# Patient Record
Sex: MALE | Race: WHITE | Employment: OTHER | URBAN - METROPOLITAN AREA
[De-identification: names, ages, dates, MRNs, and addresses within clinical notes are randomized per-mention and may not be internally consistent; named-entity substitution may affect disease eponyms.]

---

## 2018-04-30 PROBLEM — E55.9 VITAMIN D DEFICIENCY: Status: ACTIVE | Noted: 2018-04-30

## 2018-12-12 PROBLEM — R97.20 ELEVATED PSA: Status: ACTIVE | Noted: 2018-12-12

## 2019-12-16 PROBLEM — C61 PROSTATE CANCER (HCC): Status: ACTIVE | Noted: 2019-12-16

## 2020-04-14 PROBLEM — R23.2 HOT FLASH IN MALE: Status: ACTIVE | Noted: 2020-04-14

## 2020-09-10 PROBLEM — Z92.3 HISTORY OF RADIATION THERAPY: Status: ACTIVE | Noted: 2020-09-10

## 2021-03-22 ENCOUNTER — OFFICE VISIT (OUTPATIENT)
Dept: ORTHOPEDIC SURGERY | Age: 76
End: 2021-03-22
Payer: MEDICARE

## 2021-03-22 VITALS — WEIGHT: 190 LBS | BODY MASS INDEX: 27.2 KG/M2 | HEIGHT: 70 IN

## 2021-03-22 DIAGNOSIS — M17.11 PRIMARY OSTEOARTHRITIS OF RIGHT KNEE: Primary | ICD-10-CM

## 2021-03-22 DIAGNOSIS — M25.561 CHRONIC PAIN OF RIGHT KNEE: ICD-10-CM

## 2021-03-22 DIAGNOSIS — G89.29 CHRONIC PAIN OF RIGHT KNEE: ICD-10-CM

## 2021-03-22 PROCEDURE — 99203 OFFICE O/P NEW LOW 30 MIN: CPT | Performed by: ORTHOPAEDIC SURGERY

## 2021-03-22 PROCEDURE — 1036F TOBACCO NON-USER: CPT | Performed by: ORTHOPAEDIC SURGERY

## 2021-03-22 PROCEDURE — 4040F PNEUMOC VAC/ADMIN/RCVD: CPT | Performed by: ORTHOPAEDIC SURGERY

## 2021-03-22 PROCEDURE — G8427 DOCREV CUR MEDS BY ELIG CLIN: HCPCS | Performed by: ORTHOPAEDIC SURGERY

## 2021-03-22 PROCEDURE — 1123F ACP DISCUSS/DSCN MKR DOCD: CPT | Performed by: ORTHOPAEDIC SURGERY

## 2021-03-22 PROCEDURE — 20610 DRAIN/INJ JOINT/BURSA W/O US: CPT | Performed by: ORTHOPAEDIC SURGERY

## 2021-03-22 PROCEDURE — 3017F COLORECTAL CA SCREEN DOC REV: CPT | Performed by: ORTHOPAEDIC SURGERY

## 2021-03-22 PROCEDURE — G8417 CALC BMI ABV UP PARAM F/U: HCPCS | Performed by: ORTHOPAEDIC SURGERY

## 2021-03-22 PROCEDURE — G8484 FLU IMMUNIZE NO ADMIN: HCPCS | Performed by: ORTHOPAEDIC SURGERY

## 2021-03-22 RX ORDER — BUPIVACAINE HYDROCHLORIDE 2.5 MG/ML
3 INJECTION, SOLUTION INFILTRATION; PERINEURAL ONCE
Status: COMPLETED | OUTPATIENT
Start: 2021-03-22 | End: 2021-03-22

## 2021-03-22 RX ORDER — TRIAMCINOLONE ACETONIDE 40 MG/ML
80 INJECTION, SUSPENSION INTRA-ARTICULAR; INTRAMUSCULAR ONCE
Status: COMPLETED | OUTPATIENT
Start: 2021-03-22 | End: 2021-03-22

## 2021-03-22 RX ADMIN — BUPIVACAINE HYDROCHLORIDE 7.5 MG: 2.5 INJECTION, SOLUTION INFILTRATION; PERINEURAL at 12:20

## 2021-03-22 RX ADMIN — TRIAMCINOLONE ACETONIDE 80 MG: 40 INJECTION, SUSPENSION INTRA-ARTICULAR; INTRAMUSCULAR at 12:28

## 2021-03-22 NOTE — PROGRESS NOTES
Chief Complaint:   Chief Complaint   Patient presents with    Knee Injury     Right knee injury end of February. No X-rays. Was squatting, when he tried to get back up, Rt knee locked and he felt severe pain. Knee has been tender since. Has difficulty getting in and out of car. Saw [facility] 2015 for same knee, \"thought it might be issue with meniscus\"  Usng ice and wearing brace. HPI 42-year-old man here for evaluation of right knee pain. Onset about 1 month ago. Felt pain primarily medial aspect right knee after squatting. Denies significant knee pain prior. History of prostate cancer but currently states well controlled. Patient Active Problem List   Diagnosis    Gastroesophageal reflux disease without esophagitis    Vitamin D deficiency    Elevated PSA    Prostate cancer ([facility] [address]    Hot flash in male    History of radiation therapy       Past Medical History:   Diagnosis Date    Elevated PSA 12/12/2018    GERD (gastroesophageal reflux disease)        Past Surgical History:   Procedure Laterality Date    COLONOSCOPY  08/29/2019    FINGER SURGERY      Cyst removal    FOOT SURGERY      TONSILLECTOMY         Current Outpatient Medications   Medication Sig Dispense Refill    calcium carbonate (OSCAL) 500 MG TABS tablet Take 500 mg by mouth daily      esomeprazole (NEXIUM) 20 MG capsule Take 40 mg by mouth every morning (before breakfast)       Cholecalciferol (VITAMIN D3) 5000 UNITS TABS Take by mouth daily       No current facility-administered medications for this visit.         No Known Allergies    Social History     Socioeconomic History    Marital status:      Spouse name: None    Number of children: None    Years of education: None    Highest education level: High school graduate   Occupational History    None   Social Needs    Financial resource strain: Not hard at all   [place] insecurity     Worry: Never true     Inability: None    Transportation needs     Medical: No Non-medical: None   Tobacco Use    Smoking status: Former Smoker     Packs/day: 0.50     Years: 2.00     Pack years: 1.00     Types: Cigarettes     Quit date: 1964     Years since quittin.6    Smokeless tobacco: Never Used   Substance and Sexual Activity    Alcohol use: Yes     Alcohol/week: 2.0 standard drinks     Types: 2 Glasses of wine per week     Comment: daily     Drug use: No    Sexual activity: None   Lifestyle    Physical activity     Days per week: 6 days     Minutes per session: None    Stress: None   Relationships    Social connections     Talks on phone: None     Gets together: None     Attends Mu-ism service: None     Active member of club or organization: None     Attends meetings of clubs or organizations: None     Relationship status: None    Intimate partner violence     Fear of current or ex partner: None     Emotionally abused: None     Physically abused: None     Forced sexual activity: None   Other Topics Concern    None   Social History Narrative    None       Family History   Problem Relation Age of Onset    Cancer Mother     Kidney Disease Father     Heart Disease Father     Cancer Brother         prostate         Review of Systems   No fever, chills, or other constitutionalsymptoms. No numbness or other neuro symptoms. No chest pain. No dyspnea. [unfilled]   No acute distress. He is thin and muscular. Right knee demonstrates no effusion. Some tenderness along the medial joint line. Full range of motion right knee without laxity on varus valgus or Lachman testing. No pain elicited on right hip rotation. Physical Exam    Patient is alert and oriented. Well-developed well-nourished. BMI 27. Pupils equal and reactive. Scleraeanicteric. Neck supple  Lungs clear. Cardiac rate and rhythm regular. Abdomen soft and nontender. Skin warm and dry.        XRAY: Knee x-rays today including bilateral standing AP, flexion weightbearing and lateral view

## 2022-01-03 ENCOUNTER — HOSPITAL ENCOUNTER (EMERGENCY)
Age: 77
Discharge: HOME | End: 2022-01-03
Payer: MEDICARE

## 2022-01-03 VITALS
DIASTOLIC BLOOD PRESSURE: 80 MMHG | WEIGHT: 195 LBS | TEMPERATURE: 97.7 F | BODY MASS INDEX: 27.98 KG/M2 | OXYGEN SATURATION: 97 % | RESPIRATION RATE: 16 BRPM | SYSTOLIC BLOOD PRESSURE: 140 MMHG | HEART RATE: 78 BPM

## 2022-01-03 DIAGNOSIS — S61.210A LACERATION OF RIGHT INDEX FINGER WITHOUT FOREIGN BODY WITHOUT DAMAGE TO NAIL, INITIAL ENCOUNTER: Primary | ICD-10-CM

## 2022-01-03 PROCEDURE — 6370000000 HC RX 637 (ALT 250 FOR IP): Performed by: NURSE PRACTITIONER

## 2022-01-03 PROCEDURE — 12002 RPR S/N/AX/GEN/TRNK2.6-7.5CM: CPT

## 2022-01-03 PROCEDURE — 12001 RPR S/N/AX/GEN/TRNK 2.5CM/<: CPT

## 2022-01-03 PROCEDURE — 99283 EMERGENCY DEPT VISIT LOW MDM: CPT

## 2022-01-03 RX ORDER — CEFUROXIME AXETIL 500 MG/1
500 TABLET ORAL 2 TIMES DAILY
Qty: 14 TABLET | Refills: 0 | Status: SHIPPED | OUTPATIENT
Start: 2022-01-03 | End: 2022-01-10

## 2022-01-03 RX ADMIN — Medication: at 16:50

## 2022-01-03 ASSESSMENT — PAIN DESCRIPTION - LOCATION: LOCATION: FINGER (COMMENT WHICH ONE)

## 2022-01-03 ASSESSMENT — PAIN DESCRIPTION - ORIENTATION: ORIENTATION: RIGHT

## 2022-01-03 ASSESSMENT — PAIN DESCRIPTION - PAIN TYPE: TYPE: ACUTE PAIN

## 2022-01-03 ASSESSMENT — PAIN SCALES - GENERAL: PAINLEVEL_OUTOF10: 4

## 2022-01-03 NOTE — ED PROVIDER NOTES
[address]  Department of Emergency Medicine   ED  Encounter Note  Admit Date/RoomTime: 1/3/2022  3:42 PM  ED Room: [facility]/    NAME: Mohan Tidwell  : 1945  MRN: [id number]     Chief Complaint:  Laceration (right index finger lac, bleeding controlled, UTD on TDAP)    History of Present Illness       Mohan Tidwell is a 68 y.o. old male who presents to the emergency department for evaluation of right index finger laceration that occurred just prior to arrival.  Patient states a plastic fan blade accidentally lacerated his finger. He denies any uncontrolled bleeding or anticoagulant use. He has no numbness, weakness or limited range of motion of his fingers secondary to his injury. He is left-hand dominant and his tetanus vaccination is up-to-date within the last 5 years. His symptoms are aggravated by palpation and use and alleviated with rest.  His symptoms are mild in severity and persistent nature. ROS   Pertinent positives and negatives are stated within HPI, all other systems reviewed and are negative. Past Medical History:  has a past medical history of Elevated PSA and GERD (gastroesophageal reflux disease). Surgical History:  has a past surgical history that includes Tonsillectomy; Foot surgery; Finger surgery; and Colonoscopy (2019). Social History:  reports that he quit smoking about 57 years ago. His smoking use included cigarettes. He has a 1.00 pack-year smoking history. He has never used smokeless tobacco. He reports current alcohol use of about 2.0 standard drinks of alcohol per week. He reports that he does not use drugs. Family History: family history includes Cancer in his brother and mother; Heart Disease in his father; Kidney Disease in his father. Allergies: Patient has no known allergies.     Physical Exam   Oxygen Saturation Interpretation: Normal.        ED Triage Vitals   BP Temp Temp Source Pulse Resp SpO2 Height Weight   01/03/22 1231 01/03/22 1231 01/03/22 1231 01/03/22 1231 01/03/22 1231 01/03/22 1231 -- 01/03/22 1342   (!) 140/80 97.7 °F (36.5 °C) Infrared 78 16 97 %  195 lb (88.5 kg)         · Constitutional:  Alert, development consistent with age. · HEENT:  NC/NT. No outward sign of trauma. Airway patent. · Neck:  Normal ROM. Supple. · Respiratory: No respiratory distress or increased work of breathing. · Cardiovascular: Regular rate and rhythm. Strong distal pulses. · Right Upper Extremity(s): Index finger middle phalanx                 Tenderness: Mild localized at the laceration however there is no bony tenderness through the finger, hand or wrist.                Swelling: None. Deformity: no deformity observed/palpated. ROM: full range of motion. Skin: There is a 1 cm partial-thickness laceration on the dorsal aspect with a 1.6 cm partial-thickness laceration on the volar aspect, bleeding is controlled, no foreign body or tendon injury. Neurovascular: Motor deficit: none. Sensory deficit: none. Pulse deficit: none. Capillary refill: normal.  · Neurological:  Alert and oriented. Motor and sensory functions intact unless otherwise described above. Lab / Imaging Results   (All laboratory and radiology results have been personally reviewed by myself)  Labs:  No results found for this visit on 01/03/22. Imaging: All Radiology results interpreted by Radiologist unless otherwise noted. No orders to display     ED Course / Medical Decision Making     Medications   topical skin adhesive stick ( Topical Given by Other 1/3/22 1650)          Re-examination:  1/3/22     Time: 1456  Patients symptoms are improving. Repeat physical examination has improved. Discussed results and treatment plan.     Consult:   None    Procedure(s):  PROCEDURE NOTE  1/3/22       Time: 1630    LACERATION REPAIR  Risks, benefits and alternatives (for applicable procedures below) described. Performed By: GEOVANNI Currie CNP. Informed consent: Verbal consent obtained. The patient was counseled regarding the procedure in person, it's indications, risks, potential complications and alternatives and any questions were answered. Verbal consent was obtained. Laceration #: 1. Location: right index  Length: 1.6 cm  The wound area was irrigated with sterile saline, cleansed with chlorhexidine gluconate and draped in a sterile fashion. Local Anesthesia:  declined by patient. The wound was explored with the following results: Thickness: partial thickness. no foreign body or tendon injury seen. Debridement: None. Undermining: None. Wound Margins Revised: no  Flaps Aligned: yes. The wound was closed Dermabond/tissue adhesive. Dressing:  a band-aid and metal finger splint. PROCEDURE NOTE  1/3/22       Time: 36    LACERATION REPAIR  Risks, benefits and alternatives (for applicable procedures below) described. Performed By: GEOVANNI Currie CNP. Informed consent: Verbal consent obtained. The patient was counseled regarding the procedure in person, it's indications, risks, potential complications and alternatives and any questions were answered. Verbal consent was obtained. Laceration #: 2. Location: right index  Length: 1 cm. The wound area was irrigated with sterile saline, cleansed with chlorhexidine gluconate and draped in a sterile fashion. Local Anesthesia:  declined by patient. The wound was explored with the following results: Thickness: partial thickness. no foreign body or tendon injury seen. Debridement: None. Undermining: None. Wound Margins Revised: None. Flaps Aligned: yes. The wound was closed Dermabond/tissue adhesive. Dressing:  a band-aid and metal finger splint. There were no additional wounds requiring formal closure.           MDM:    Neurovascularly intact. Tetanus up to date. Wound repaired. Wound care discussed. Plan is for BID wound care, OTC tylenol/motrin as needed and watchful waiting for starting oral ABX for S/S of wound infection which patient was given a printed prescription for ceftin. Otherwise outpatient follow up with the PCP/clinic for a wound recheck as needed. Patient is aware of S/S indicative of re-evaluation in the ER setting. Patient verbalizes understanding of instructions and departed in stable condition. Plan of Care/Counseling:  GEOVANNI Parson CNP reviewed today's visit with the patient in addition to providing specific details for the plan of care and counseling regarding the diagnosis and prognosis. Questions are answered at this time and are agreeable with the plan. Assessment      1. Laceration of right index finger without foreign body without damage to nail, initial encounter      Plan   Discharged home. Patient condition is stable    New Medications     Discharge Medication List as of 1/3/2022  4:35 PM      START taking these medications    Details   cefUROXime (CEFTIN) 500 MG tablet Take 1 tablet by mouth 2 times daily for 7 days, Disp-14 tablet, R-0Print           Electronically signed by GEOVANNI Parson CNP   DD: 1/3/22  **This report was transcribed using voice recognition software. Every effort was made to ensure accuracy; however, inadvertent computerized transcription errors may be present.   END OF ED PROVIDER NOTE       GEOVANNI Parson CNP  01/03/22 7266

## 2022-04-06 PROBLEM — N52.35 ERECTILE DYSFUNCTION FOLLOWING RADIATION THERAPY: Status: ACTIVE | Noted: 2022-04-06
